# Patient Record
(demographics unavailable — no encounter records)

---

## 2025-04-30 NOTE — HISTORY OF PRESENT ILLNESS
[FreeTextEntry8] : Acute care visit reports dry cough for 2 weeks, also very hoarse voice. denies fever, chills, sob.   She was recently hospitalized for dermatomyositis complications.  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 She is traveling to [place] today, she has apt with specialist to help with treatment plan, as most meds have failed so far.  [Family Member] : family member

## 2025-06-25 NOTE — REVIEW OF SYSTEMS
[As Noted in HPI] : as noted in HPI [Hearing Loss] : hearing loss [Patient Intake Form Reviewed] : Patient intake form was reviewed [Negative] : Heme/Lymph [Ear Pain] : no ear pain [Ear Itch] : no ear itch [Dizziness] : no dizziness [Vertigo] : no vertigo [Ear Noises] : no ear noises [de-identified] : question of hearing loss, wears b/l aud aids

## 2025-07-18 NOTE — HISTORY OF PRESENT ILLNESS
[FreeTextEntry8] : 68 y/o F PMH dermatomyositis, ?TIA at 21 (slurred speech), now on ASA here for an acute viist  She has been getting hyperbaric treant for few weeks and BP has been elevate systolic in160 so was sent ot us for further management He nifedipine 60 mg was d/c in April and switched to sildenafil by her rheumatologist She is accompanied with  her last labs done by pcp reviewed in 1/10/2025 last  hospital records in [place] reviewed rheumatologist Dr Bernabe South: [phone]

## 2025-07-18 NOTE — PHYSICAL EXAM
[No Acute Distress] : no acute distress [Normal] : soft, non-tender, non-distended, no masses palpated, no HSM and normal bowel sounds [de-identified] : chronic wound limbs